# Patient Record
Sex: FEMALE | Race: BLACK OR AFRICAN AMERICAN | NOT HISPANIC OR LATINO | Employment: FULL TIME | URBAN - METROPOLITAN AREA
[De-identification: names, ages, dates, MRNs, and addresses within clinical notes are randomized per-mention and may not be internally consistent; named-entity substitution may affect disease eponyms.]

---

## 2022-02-08 ENCOUNTER — SOCIAL WORK (OUTPATIENT)
Dept: ADMINISTRATIVE | Facility: OTHER | Age: 22
End: 2022-02-08

## 2022-02-08 NOTE — PROGRESS NOTES
SW met with pt regarding initial OB assessment. Pt stated this is her 1st pregnancy/0-miscarriage. Pt stated lives with her mother and able to perform ADL's independently. Pt stated does work. Pt stated support system is her mother/[employer]. Pt stated does not have medicaid. Pt informed to go down stair to the financial assistance office and apply for medicaid. Pt stated does not have WIC. Pt stated unsure about breastfeed. SW provide pt with information on other community resources.  No other needs identified at this time.    Danielle Prabhakar,MSW  Pager#[phone]

## 2022-03-03 PROBLEM — O99.012 ANEMIA AFFECTING PREGNANCY IN SECOND TRIMESTER: Status: ACTIVE | Noted: 2022-03-03

## 2022-03-03 PROBLEM — Z34.02 ENCOUNTER FOR SUPERVISION OF NORMAL FIRST PREGNANCY, SECOND TRIMESTER: Status: ACTIVE | Noted: 2022-03-03

## 2022-04-11 PROBLEM — O30.041 DICHORIONIC DIAMNIOTIC TWIN PREGNANCY IN FIRST TRIMESTER: Status: ACTIVE | Noted: 2022-04-11

## 2022-05-16 PROBLEM — R10.9 ABDOMINAL PAIN DURING PREGNANCY IN SECOND TRIMESTER: Status: ACTIVE | Noted: 2022-05-16

## 2022-05-16 PROBLEM — O30.042 DICHORIONIC DIAMNIOTIC TWIN PREGNANCY IN SECOND TRIMESTER: Status: ACTIVE | Noted: 2022-04-11

## 2022-05-16 PROBLEM — O26.892 ABDOMINAL PAIN DURING PREGNANCY IN SECOND TRIMESTER: Status: ACTIVE | Noted: 2022-05-16

## 2022-05-25 PROBLEM — O10.919 CHRONIC HYPERTENSION AFFECTING PREGNANCY: Status: ACTIVE | Noted: 2022-05-25

## 2022-05-25 PROBLEM — O13.2 GESTATIONAL HYPERTENSION, SECOND TRIMESTER: Status: ACTIVE | Noted: 2022-05-25

## 2022-05-25 PROBLEM — R03.0 ELEVATED BLOOD PRESSURE READING: Status: ACTIVE | Noted: 2022-05-25

## 2022-05-25 PROBLEM — I44.1 ATRIOVENTRICULAR BLOCK, MOBITZ TYPE 1, WENCKEBACH: Status: ACTIVE | Noted: 2022-05-25

## 2022-05-25 PROBLEM — O16.9 ELEVATED BLOOD PRESSURE AFFECTING PREGNANCY, ANTEPARTUM: Status: ACTIVE | Noted: 2022-05-25

## 2022-07-07 PROBLEM — O13.3 GESTATIONAL HYPERTENSION, THIRD TRIMESTER: Status: ACTIVE | Noted: 2022-07-07

## 2022-07-07 PROBLEM — O99.013 ANEMIA DURING PREGNANCY IN THIRD TRIMESTER: Status: ACTIVE | Noted: 2022-03-03

## 2022-07-08 PROBLEM — O14.93 PRE-ECLAMPSIA IN THIRD TRIMESTER: Status: ACTIVE | Noted: 2022-07-08

## 2022-07-08 PROBLEM — O36.5990 PREGNANCY AFFECTED BY FETAL GROWTH RESTRICTION: Status: ACTIVE | Noted: 2022-07-08

## 2022-07-11 PROBLEM — O14.13 SEVERE PRE-ECLAMPSIA IN THIRD TRIMESTER: Status: RESOLVED | Noted: 2022-07-11 | Resolved: 2022-07-11

## 2022-07-11 PROBLEM — O14.93 PRE-ECLAMPSIA IN THIRD TRIMESTER: Status: RESOLVED | Noted: 2022-07-08 | Resolved: 2022-07-11

## 2022-07-11 PROBLEM — O16.9 ELEVATED BLOOD PRESSURE AFFECTING PREGNANCY, ANTEPARTUM: Status: RESOLVED | Noted: 2022-05-25 | Resolved: 2022-07-11

## 2022-07-11 PROBLEM — O11.9 CHRONIC HYPERTENSION WITH SUPERIMPOSED PRE-ECLAMPSIA: Status: ACTIVE | Noted: 2022-05-25

## 2022-07-11 PROBLEM — O13.3 GESTATIONAL HYPERTENSION, THIRD TRIMESTER: Status: RESOLVED | Noted: 2022-07-07 | Resolved: 2022-07-11

## 2022-07-11 PROBLEM — O14.13 SEVERE PRE-ECLAMPSIA IN THIRD TRIMESTER: Status: ACTIVE | Noted: 2022-07-11
